# Patient Record
Sex: FEMALE | Race: BLACK OR AFRICAN AMERICAN | Employment: OTHER | URBAN - METROPOLITAN AREA
[De-identification: names, ages, dates, MRNs, and addresses within clinical notes are randomized per-mention and may not be internally consistent; named-entity substitution may affect disease eponyms.]

---

## 2017-02-13 ENCOUNTER — HOSPITAL ENCOUNTER (OUTPATIENT)
Dept: LAB | Age: 82
Discharge: HOME | End: 2017-02-13
Payer: MEDICARE

## 2017-02-13 ENCOUNTER — OFFICE VISIT (OUTPATIENT)
Dept: HEMATOLOGY | Age: 82
End: 2017-02-13

## 2017-02-13 VITALS
TEMPERATURE: 97 F | WEIGHT: 176 LBS | BODY MASS INDEX: 27.62 KG/M2 | OXYGEN SATURATION: 98 % | SYSTOLIC BLOOD PRESSURE: 139 MMHG | RESPIRATION RATE: 16 BRPM | DIASTOLIC BLOOD PRESSURE: 63 MMHG | HEART RATE: 41 BPM | HEIGHT: 67 IN

## 2017-02-13 DIAGNOSIS — B17.10 ACUTE HEPATITIS C VIRUS INFECTION WITHOUT HEPATIC COMA: ICD-10-CM

## 2017-02-13 DIAGNOSIS — B18.2 CHRONIC HEPATITIS C WITHOUT HEPATIC COMA (HCC): Primary | ICD-10-CM

## 2017-02-13 DIAGNOSIS — B18.2 CHRONIC HEPATITIS C WITHOUT HEPATIC COMA (HCC): ICD-10-CM

## 2017-02-13 LAB
ALBUMIN SERPL BCP-MCNC: 3.9 G/DL (ref 3.4–5)
ALBUMIN/GLOB SERPL: 1 {RATIO} (ref 0.8–1.7)
ALP SERPL-CCNC: 188 U/L (ref 45–117)
ALT SERPL-CCNC: 38 U/L (ref 13–56)
ANION GAP BLD CALC-SCNC: 12 MMOL/L (ref 3–18)
AST SERPL W P-5'-P-CCNC: 33 U/L (ref 15–37)
BASOPHILS # BLD AUTO: 0 K/UL (ref 0–0.06)
BASOPHILS # BLD: 1 % (ref 0–2)
BILIRUB DIRECT SERPL-MCNC: 0.1 MG/DL (ref 0–0.2)
BILIRUB SERPL-MCNC: 0.4 MG/DL (ref 0.2–1)
BUN SERPL-MCNC: 33 MG/DL (ref 7–18)
BUN/CREAT SERPL: 17 (ref 12–20)
CALCIUM SERPL-MCNC: 9.8 MG/DL (ref 8.5–10.1)
CHLORIDE SERPL-SCNC: 103 MMOL/L (ref 100–108)
CO2 SERPL-SCNC: 28 MMOL/L (ref 21–32)
CREAT SERPL-MCNC: 1.96 MG/DL (ref 0.6–1.3)
DIFFERENTIAL METHOD BLD: ABNORMAL
EOSINOPHIL # BLD: 0.2 K/UL (ref 0–0.4)
EOSINOPHIL NFR BLD: 4 % (ref 0–5)
ERYTHROCYTE [DISTWIDTH] IN BLOOD BY AUTOMATED COUNT: 13.1 % (ref 11.6–14.5)
GLOBULIN SER CALC-MCNC: 4.1 G/DL (ref 2–4)
GLUCOSE SERPL-MCNC: 106 MG/DL (ref 74–99)
HCT VFR BLD AUTO: 37.4 % (ref 35–45)
HGB BLD-MCNC: 12.4 G/DL (ref 12–16)
LYMPHOCYTES # BLD AUTO: 33 % (ref 21–52)
LYMPHOCYTES # BLD: 1.3 K/UL (ref 0.9–3.6)
MCH RBC QN AUTO: 29.4 PG (ref 24–34)
MCHC RBC AUTO-ENTMCNC: 33.2 G/DL (ref 31–37)
MCV RBC AUTO: 88.6 FL (ref 74–97)
MONOCYTES # BLD: 0.3 K/UL (ref 0.05–1.2)
MONOCYTES NFR BLD AUTO: 8 % (ref 3–10)
NEUTS SEG # BLD: 2.2 K/UL (ref 1.8–8)
NEUTS SEG NFR BLD AUTO: 54 % (ref 40–73)
PLATELET # BLD AUTO: 221 K/UL (ref 135–420)
PMV BLD AUTO: 11.7 FL (ref 9.2–11.8)
POTASSIUM SERPL-SCNC: 4.7 MMOL/L (ref 3.5–5.5)
PROT SERPL-MCNC: 8 G/DL (ref 6.4–8.2)
RBC # BLD AUTO: 4.22 M/UL (ref 4.2–5.3)
SODIUM SERPL-SCNC: 143 MMOL/L (ref 136–145)
WBC # BLD AUTO: 4.1 K/UL (ref 4.6–13.2)

## 2017-02-13 PROCEDURE — 80048 BASIC METABOLIC PNL TOTAL CA: CPT | Performed by: NURSE PRACTITIONER

## 2017-02-13 PROCEDURE — 87900 PHENOTYPE INFECT AGENT DRUG: CPT | Performed by: NURSE PRACTITIONER

## 2017-02-13 PROCEDURE — 36415 COLL VENOUS BLD VENIPUNCTURE: CPT | Performed by: NURSE PRACTITIONER

## 2017-02-13 PROCEDURE — 87522 HEPATITIS C REVRS TRNSCRPJ: CPT | Performed by: NURSE PRACTITIONER

## 2017-02-13 PROCEDURE — 85025 COMPLETE CBC W/AUTO DIFF WBC: CPT | Performed by: NURSE PRACTITIONER

## 2017-02-13 PROCEDURE — 80076 HEPATIC FUNCTION PANEL: CPT | Performed by: NURSE PRACTITIONER

## 2017-02-13 NOTE — PROGRESS NOTES
Gage Lowry MD, CHIP Lenz PA-C Haze Grumbles, MD, Aliya Lange MD     [facility], NP     Jana Robledo NP        [address] [facility], [address] [address]  22.     [phone]     FAX: [address] [place] [address], [address] [place] [address], [address]     [phone]     FAX: [phone]           Patient Care Team:  Kathe Benjamin MD as PCP - General (Family Practice)  Jean Magaña MD (Oncology)  Stephenie Thurman MD (Nephrology)      Problem List  Date Reviewed: 8/11/2016          Codes Class Noted    Secondary hypertension ICD-10-CM: I15.9  ICD-9-CM: 405.99  7/14/2016        S/p nephrectomy ICD-10-CM: Z90.5  ICD-9-CM: V45.73  7/14/2016        Vitamin D deficiency ICD-10-CM: E55.9  ICD-9-CM: 268.9  7/14/2016        Hyperlipidemia ICD-10-CM: E78.5  ICD-9-CM: 272.4  7/14/2016        Hepatitis C, acute ICD-10-CM: B17.10  ICD-9-CM: 070.51  7/14/2016              Mireya Mcgill returns to the [place] [address] today for education and management of chronic hepatitis C virus. The active problem list, all pertinent past medical history, medications, liver histology, endoscopic studies, radiologic findings and laboratory findings related to the liver disorder were reviewed with the patient. The patient is a 80 y.o. Black female who was noted to have chronic HCV in the early 1990's. The HCV has never been treated. 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 Imaging of the liver was recently performed with ultrasound and this demonstrates a diffusely heterogeneous hepatic echotexture. No focal hepatic mass. A liver biopsy has not been performed.    Shear wave elastography was performed and suggest mild disease, F1.  KPa is 6.31    The most recent laboratory studies indicate that the liver transaminases are normal, alkaline phosphatase is elevated, tests of hepatic synthetic and metabolic function are normal, and the platelet count is normal.      The patient has no symptoms which can be attributed to the liver disorder. She had a nephrectomy over 20 years ago for benign mass. The patient completes all daily activities without any functional limitations. The patient has not experienced fatigue, fevers, chills, shortness of breath, chest pain, pain in the right side over the liver, diffuse abdominal pain, nausea, vomiting, constipation, diarrhrea, dry eyes, dry mouth, arthralgias, myalgias, yellowing of the eyes or skin, itching, dark urine, problems concentrating, swelling of the abdomen, swelling of the lower extremities, hematemesis, or hematochezia. ALLERGIES  Allergies   Allergen Reactions    Iron Other (comments)     Intolerant -- IV infused iron    Percocet [Oxycodone-Acetaminophen] Other (comments)     Cardiac Dysrhythmia     Ultram [Tramadol] Other (comments)     Cardiac Dysrhythmia       MEDICATIONS  Current Outpatient Prescriptions   Medication Sig    ergocalciferol (ERGOCALCIFEROL) 50,000 unit capsule Take 50,000 Units by mouth.  cloNIDine HCl (CATAPRES) 0.1 mg tablet Take  by mouth two (2) times a day.  atorvastatin (LIPITOR) 10 mg tablet Take  by mouth daily.  furosemide (LASIX) 20 mg tablet Take  by mouth daily.  docusate sodium (COLACE) 100 mg capsule Take 100 mg by mouth two (2) times a day.  amLODIPine (NORVASC) 10 mg tablet Take  by mouth daily.  gabapentin (NEURONTIN) 300 mg capsule Take 300 mg by mouth three (3) times daily.  acetaminophen-codeine (TYLENOL #3) 300-30 mg per tablet Take 1 Tab by mouth every four (4) hours as needed for Pain.  sodium polystyrene (KAYEXALATE) 15 gram/60 mL suspension Take 15 g by mouth now.  omega-3 fatty acids-vitamin e 1,000 mg cap Take 1 Cap by mouth.     FERROUS SULFATE PO Take  by mouth. No current facility-administered medications for this visit. SYSTEM REVIEW NOT RELATED TO LIVER DISEASE OR REVIEWED ABOVE:  Constitution systems: Negative for fever, chills, weight gain, weight loss. Eyes: Negative for visual changes. ENT: Negative for sore throat, painful swallowing. Respiratory: Negative for cough, hemoptysis, SOB. Cardiology: Negative for chest pain, palpitations. GI:  Negative for constipation or diarrhea. : Negative for urinary frequency, dysuria, hematuria, nocturia. Skin: Negative for rash. Hematology: Negative for easy bruising, blood clots. Musculo-skelatal: Negative for back pain, muscle pain, weakness. Neurologic: Negative for headaches, dizziness, vertigo, memory problems not related to HE. Psychology: Negative for anxiety, depression. FAMILY HISTORY:  There is no family history of liver disease. SOCIAL HISTORY:  The patient is . The patient has 1 child. The patient has never used tobacco products. The patient has never consumed significant amounts of alcohol. The patient does not work. PHYSICAL EXAMINATION:  Visit Vitals    /63 (BP 1 Location: Left arm, BP Patient Position: Sitting)    Pulse (!) 41    Temp 97 °F (36.1 °C) (Tympanic)    Resp 16    Ht 5' 7\" (1.702 m)    Wt 176 lb (79.8 kg)    SpO2 98%    BMI 27.57 kg/m2     General: No acute distress. Eyes: Sclera anicteric. ENT: No oral lesions. Thyroid normal.  Nodes: No adenopathy. Skin: No spider angiomata. No jaundice. No palmar erythema. Respiratory: Lungs clear to auscultation. Cardiovascular: Regular heart rate. No murmurs. No JVD. Abdomen: Soft non-tender. Liver size normal to percussion/palpation. Spleen not palpable. No obvious ascites. Extremities: No edema. No muscle wasting. No gross arthritic changes. Neurologic: Alert and oriented. Cranial nerves grossly intact. No asterixis.     LABORATORY STUDIES:  Liver [facility] Ref Rng 7/14/2016   WBC 4.6 - 13.2 K/uL 3.8 (L)   ANC 1.8 - 8.0 K/UL 2.0   HGB 12.0 - 16.0 g/dL 12.9    - 420 K/uL 220   INR 0.8 - 1.2   1.0   AST 15 - 37 U/L 31   ALT 13 - 56 U/L 39   Alk Phos 45 - 117 U/L 194 (H)   Bili, Total 0.2 - 1.0 MG/DL 0.4   Bili, Direct 0.0 - 0.2 MG/DL 0.1   Albumin 3.4 - 5.0 g/dL 4.0   BUN 7.0 - 18 MG/DL 33 (H)   Creat 0.6 - 1.3 MG/DL 1.99 (H)   Na 136 - 145 mmol/L 141   K 3.5 - 5.5 mmol/L 4.4   Cl 100 - 108 mmol/L 102   CO2 21 - 32 mmol/L 29   Glucose 74 - 99 mg/dL 92     SEROLOGIES:  Serologies Latest Ref Rng 7/14/2016   Hep A Ab, Total NEGATIVE   Positive (A)   Hep B Surface Ag <1.00 Index <0.10   Hep B Surface Ag Interp NEG   NEGATIVE   Hep B Core Ab, Total NEGATIVE   NEGATIVE   Hep B Surface Ab >10.0 mIU/mL <3.10 (L)   Hep B Surface Ab Interp POS   NEGATIVE (A)   Hep C Genotype  1a   HCV RT-PCR, Quant IU/mL [id number]   Ferritin 8 - 388 NG/ML 91   Iron % Saturation % 29   ADDY, IFA  NEGATIVE   ASMCA 0 - 19 Units 14   M2 Ab 0.0 - 20.0 Units 8.0     LIVER HISTOLOGY:  07/2016. Shear wave elastography with ultrasound.  kPa was 6.31. Suggested fibrosis level is F1.    ENDOSCOPIC PROCEDURES:  Not available or performed    RADIOLOGY:  07/2016. Ultrasound of the liver. Diffusely heterogeneous hepatic echotexture. No focal hepatic mass. OTHER TESTING:  Not available or performed    ASSESSMENT AND PLAN:  Chronic HCV with portal fibrosis. The most recent laboratory studies indicate that the liver transaminases are normal, alkaline phosphatase is elevated, tests of hepatic synthetic and metabolic function are normal, and the platelet count is normal.  Will perform laboratory testing to monitor liver function and degree of liver injury. This will include hepatic panel, a CBC w/ diff, a BMP, an NS5A resistance assay, and an HCV RNA. Serologic evaluation was negative for all causes of chronic liver disease.      Based upon laboratory studies, imaging, and elastography, the patient does not appear to have advanced liver disease or cirrhosis. HCV. The patient has genotype 1A and is treatment naive. She is very healthy and likely has higher than expected life expectancy. Treatment is simple, finite, and safe. We discussed treatment options in detail. A treatment regime for renally impaired patients is now available. This treatment is Zepatier, a combination medication utilizing elbasvir (an NS5A inhibitor) and grazoprevir (an NS3/4A protease inhibitor), either with or without ribavirin for either 12 or 16 weeks depending on the patients NS5A polymorphism status. Her creatinine  clearance is 26.2. Veldon Canter was previously ordered but denied because she never had the NS5A testing completed. Ultrasound with shear wave elastography was recently completed. This demonstrates an essentially normal liver with mild fibrosis, F1. The patient was directed to continue all current medications at the current dosages. There are no contraindications for the patient to take any medications that are necessary for treatment of other medical issues. Vaccination for viral hepatitis A not required. The patient has serologic evidence of prior exposure or vaccination with immunity. Vaccination for viral hepatitis B is recommended. The patient has no serologic evidence of prior exposure or vaccination with immunity. All of the above issues were discussed with the patient. All questions were answered. The patient expressed a clear understanding of the above. [facility] 87 in 4 months. She will make a treatment week 4 appointment if we able to get the Zepatier approved.       Collin Wallis NP   [facility] [place] of [address] [place] [address], [address] Kadie Link, [address] [facility]   [phone]

## 2017-02-13 NOTE — MR AVS SNAPSHOT
Visit Information Date & Time Provider Department Dept. Phone Encounter #  
 2/13/2017 10:30 AM May Mccauley NP Liver [place] of [address] [place] [address] [id number] Follow-up Instructions Return in about 3 months (around 5/13/2017). Upcoming Health Maintenance Date Due DTaP/Tdap/Td series (1 - Tdap) 9/28/1955 ZOSTER VACCINE AGE 60> 9/28/1994 GLAUCOMA SCREENING Q2Y 9/28/1999 OSTEOPOROSIS SCREENING (DEXA) 9/28/1999 Pneumococcal 65+ Low/Medium Risk (1 of 2 - PCV13) 9/28/1999 MEDICARE YEARLY EXAM 9/28/1999 INFLUENZA AGE 9 TO ADULT 8/1/2016 Allergies as of 2/13/2017  Review Complete On: 2/13/2017 By: May Mccauley NP Severity Noted Reaction Type Reactions Iron  07/14/2016    Other (comments) Intolerant -- IV infused iron Percocet [Oxycodone-acetaminophen]  07/14/2016    Other (comments) Cardiac Dysrhythmia Ultram [Tramadol]  07/14/2016    Other (comments) Cardiac Dysrhythmia Current Immunizations  Never Reviewed No immunizations on file. Not reviewed this visit You Were Diagnosed With   
  
 Codes Comments Chronic hepatitis C without hepatic coma (HCC)    -  Primary ICD-10-CM: B18.2 ICD-9-CM: 070.54 Acute hepatitis C virus infection without hepatic coma     ICD-10-CM: B17.10 ICD-9-CM: 070.51 Vitals BP Pulse Temp Resp Height(growth percentile) Weight(growth percentile) 139/63 (BP 1 Location: Left arm, BP Patient Position: Sitting) (!) 41 97 °F (36.1 °C) (Tympanic) 16 5' 7\" (1.702 m) 176 lb (79.8 kg) SpO2 BMI OB Status Smoking Status 98% 27.57 kg/m2 Hysterectomy Former Smoker Vitals History BMI and BSA Data Body Mass Index Body Surface Area  
 27.57 kg/m 2 1.94 m 2 Preferred Pharmacy Pharmacy Name Phone [phone] - [place] [facility], [facility] AT [address] [place] [address] 80 [phone] Your Updated Medication List  
  
   
This list is accurate as of: 2/13/17 11:31 AM.  Always use your most recent med list.  
  
  
  
  
 acetaminophen-codeine 300-30 mg per tablet Commonly known as:  TYLENOL #3 Take 1 Tab by mouth every four (4) hours as needed for Pain. amLODIPine 10 mg tablet Commonly known as:  Arlyss [place] Take  by mouth daily. atorvastatin 10 mg tablet Commonly known as:  LIPITOR Take  by mouth daily. cloNIDine HCl 0.1 mg tablet Commonly known as:  CATAPRES Take  by mouth two (2) times a day. docusate sodium 100 mg capsule Commonly known as:  Vermell Nones Take 100 mg by mouth two (2) times a day. ergocalciferol 50,000 unit capsule Commonly known as:  ERGOCALCIFEROL Take 50,000 Units by mouth. FERROUS SULFATE PO Take  by mouth. furosemide 20 mg tablet Commonly known as:  LASIX Take  by mouth daily. gabapentin 300 mg capsule Commonly known as:  NEURONTIN Take 300 mg by mouth three (3) times daily. omega-3 fatty acids-vitamin e 1,000 mg Cap Take 1 Cap by mouth.  
  
 sodium polystyrene 15 gram/60 mL suspension Commonly known as:  KAYEXALATE Take 15 g by mouth now. Follow-up Instructions Return in about 3 months (around 5/13/2017). To-Do List   
 02/13/2017 Lab:  CBC WITH AUTOMATED DIFF   
  
 02/13/2017 Lab:  HCV NS5A DRUG RESISTANCE ASSAY   
  
 02/13/2017 Lab:  HCV, QT WITH GRAPH   
  
 02/13/2017 Lab:  HEPATIC FUNCTION PANEL   
  
 02/13/2017 Lab:  METABOLIC PANEL, BASIC Introducing [facility] & HEALTH SERVICES! [employer] introduces [employer] patient portal. Now you can access parts of your medical record, email your doctor's office, and request medication refills online. 1. In your internet browser, go to [email or web address] 2. Click on the First Time User? Click Here link in the Sign In box. You will see the New Member Sign Up page. 3. Enter your [employer] Access Code exactly as it appears below. You will not need to use this code after youve completed the sign-up process. If you do not sign up before the expiration date, you must request a new code. · [employer] Access Code: [email or web address] Expires: 5/14/2017 11:31 AM 
 
4. Enter the last four digits of your Social Security Number (xxxx) and Date of Birth (mm/dd/yyyy) as indicated and click Submit. You will be taken to the next sign-up page. 5. Create a [employer] ID. This will be your [employer] login ID and cannot be changed, so think of one that is secure and easy to remember. 6. Create a [employer] password. You can change your password at any time. 7. Enter your Password Reset Question and Answer. This can be used at a later time if you forget your password. 8. Enter your e-mail address. You will receive e-mail notification when new information is available in [address] 9. Click Sign Up. You can now view and download portions of your medical record. 10. Click the Download Summary menu link to download a portable copy of your medical information. If you have questions, please visit the Frequently Asked Questions section of the [employer] website. Remember, [employer] is NOT to be used for urgent needs. For medical emergencies, dial 911. Now available from your iPhone and Android! Please provide this summary of care documentation to your next provider. Your primary care clinician is listed as [employer]. If you have any questions after today's visit, please call [phone].

## 2017-03-08 LAB
HCV NS5 MUT DET ISLT GENOTYP: NORMAL
HCV RESIS PANELL ISLT GENOTYP: NORMAL
REF LAB TEST METHOD: NORMAL

## 2017-03-19 LAB
HCV GRAPH, HCVGR: NORMAL
HCV RNA SERPL NAA+PROBE-ACNC: NORMAL IU/ML
HCV RNA SERPL NAA+PROBE-LOG IU: 5.72 LOG10 IU/ML
SERIAL MONITORING: NORMAL

## 2017-05-16 ENCOUNTER — HOSPITAL ENCOUNTER (OUTPATIENT)
Dept: LAB | Age: 82
Discharge: HOME | End: 2017-05-16
Payer: MEDICARE

## 2017-05-16 ENCOUNTER — OFFICE VISIT (OUTPATIENT)
Dept: HEMATOLOGY | Age: 82
End: 2017-05-16

## 2017-05-16 VITALS
DIASTOLIC BLOOD PRESSURE: 68 MMHG | OXYGEN SATURATION: 100 % | HEIGHT: 67 IN | SYSTOLIC BLOOD PRESSURE: 159 MMHG | BODY MASS INDEX: 25.74 KG/M2 | HEART RATE: 45 BPM | TEMPERATURE: 97.4 F | WEIGHT: 164 LBS | RESPIRATION RATE: 16 BRPM

## 2017-05-16 DIAGNOSIS — B18.2 CHRONIC HEPATITIS C WITHOUT HEPATIC COMA (HCC): Primary | ICD-10-CM

## 2017-05-16 DIAGNOSIS — B18.2 CHRONIC HEPATITIS C WITHOUT HEPATIC COMA (HCC): ICD-10-CM

## 2017-05-16 LAB
ALBUMIN SERPL BCP-MCNC: 4 G/DL (ref 3.4–5)
ALBUMIN/GLOB SERPL: 0.8 {RATIO} (ref 0.8–1.7)
ALP SERPL-CCNC: 217 U/L (ref 45–117)
ALT SERPL-CCNC: 27 U/L (ref 13–56)
ANION GAP BLD CALC-SCNC: 8 MMOL/L (ref 3–18)
AST SERPL W P-5'-P-CCNC: 30 U/L (ref 15–37)
BASOPHILS # BLD AUTO: 0 K/UL (ref 0–0.06)
BASOPHILS # BLD: 1 % (ref 0–2)
BILIRUB DIRECT SERPL-MCNC: 0.1 MG/DL (ref 0–0.2)
BILIRUB SERPL-MCNC: 0.4 MG/DL (ref 0.2–1)
BUN SERPL-MCNC: 26 MG/DL (ref 7–18)
BUN/CREAT SERPL: 14 (ref 12–20)
CALCIUM SERPL-MCNC: 10.2 MG/DL (ref 8.5–10.1)
CHLORIDE SERPL-SCNC: 105 MMOL/L (ref 100–108)
CO2 SERPL-SCNC: 28 MMOL/L (ref 21–32)
CREAT SERPL-MCNC: 1.9 MG/DL (ref 0.6–1.3)
DIFFERENTIAL METHOD BLD: ABNORMAL
EOSINOPHIL # BLD: 0.1 K/UL (ref 0–0.4)
EOSINOPHIL NFR BLD: 3 % (ref 0–5)
ERYTHROCYTE [DISTWIDTH] IN BLOOD BY AUTOMATED COUNT: 12.8 % (ref 11.6–14.5)
GLOBULIN SER CALC-MCNC: 5 G/DL (ref 2–4)
GLUCOSE SERPL-MCNC: 89 MG/DL (ref 74–99)
HCT VFR BLD AUTO: 38.3 % (ref 35–45)
HGB BLD-MCNC: 13.1 G/DL (ref 12–16)
LYMPHOCYTES # BLD AUTO: 33 % (ref 21–52)
LYMPHOCYTES # BLD: 1.2 K/UL (ref 0.9–3.6)
MCH RBC QN AUTO: 29.2 PG (ref 24–34)
MCHC RBC AUTO-ENTMCNC: 34.2 G/DL (ref 31–37)
MCV RBC AUTO: 85.5 FL (ref 74–97)
MONOCYTES # BLD: 0.2 K/UL (ref 0.05–1.2)
MONOCYTES NFR BLD AUTO: 6 % (ref 3–10)
NEUTS SEG # BLD: 2.1 K/UL (ref 1.8–8)
NEUTS SEG NFR BLD AUTO: 57 % (ref 40–73)
PLATELET # BLD AUTO: 195 K/UL (ref 135–420)
PMV BLD AUTO: 11.4 FL (ref 9.2–11.8)
POTASSIUM SERPL-SCNC: 3.7 MMOL/L (ref 3.5–5.5)
PROT SERPL-MCNC: 9 G/DL (ref 6.4–8.2)
RBC # BLD AUTO: 4.48 M/UL (ref 4.2–5.3)
SODIUM SERPL-SCNC: 141 MMOL/L (ref 136–145)
WBC # BLD AUTO: 3.7 K/UL (ref 4.6–13.2)

## 2017-05-16 PROCEDURE — 80048 BASIC METABOLIC PNL TOTAL CA: CPT | Performed by: NURSE PRACTITIONER

## 2017-05-16 PROCEDURE — 85025 COMPLETE CBC W/AUTO DIFF WBC: CPT | Performed by: NURSE PRACTITIONER

## 2017-05-16 PROCEDURE — 36415 COLL VENOUS BLD VENIPUNCTURE: CPT | Performed by: NURSE PRACTITIONER

## 2017-05-16 PROCEDURE — 80076 HEPATIC FUNCTION PANEL: CPT | Performed by: NURSE PRACTITIONER

## 2017-05-16 PROCEDURE — 87522 HEPATITIS C REVRS TRNSCRPJ: CPT | Performed by: NURSE PRACTITIONER

## 2017-05-16 PROCEDURE — 87902 NFCT AGT GNTYP ALYS HEP C: CPT | Performed by: NURSE PRACTITIONER

## 2017-05-16 PROCEDURE — 87900 PHENOTYPE INFECT AGENT DRUG: CPT | Performed by: NURSE PRACTITIONER

## 2017-05-16 NOTE — MR AVS SNAPSHOT
Visit Information Date & Time Provider Department Dept. Phone Encounter #  
 5/16/2017 11:00 AM Alessandra Theodore NP Liver [place] of [address] [place] [address] [id number] Follow-up Instructions Return in about 3 months (around 8/16/2017). Upcoming Health Maintenance Date Due DTaP/Tdap/Td series (1 - Tdap) 9/28/1955 ZOSTER VACCINE AGE 60> 9/28/1994 GLAUCOMA SCREENING Q2Y 9/28/1999 OSTEOPOROSIS SCREENING (DEXA) 9/28/1999 Pneumococcal 65+ Low/Medium Risk (1 of 2 - PCV13) 9/28/1999 MEDICARE YEARLY EXAM 9/28/1999 INFLUENZA AGE 9 TO ADULT 8/1/2017 Allergies as of 5/16/2017  Review Complete On: 5/16/2017 By: Sierra Hurtado Severity Noted Reaction Type Reactions Iron  07/14/2016    Other (comments) Intolerant -- IV infused iron Percocet [Oxycodone-acetaminophen]  07/14/2016    Other (comments) Cardiac Dysrhythmia Ultram [Tramadol]  07/14/2016    Other (comments) Cardiac Dysrhythmia Current Immunizations  Never Reviewed No immunizations on file. Not reviewed this visit You Were Diagnosed With   
  
 Codes Comments Chronic hepatitis C without hepatic coma (HCC)    -  Primary ICD-10-CM: B18.2 ICD-9-CM: 070.54 Vitals BP Pulse Temp Resp Height(growth percentile) 159/68 (BP 1 Location: Left arm, BP Patient Position: Sitting) (!) 45 97.4 °F (36.3 °C) (Tympanic) 16 5' 7\" (1.702 m) Weight(growth percentile) SpO2 BMI OB Status Smoking Status 164 lb (74.4 kg) 100% 25.69 kg/m2 Hysterectomy Former Smoker Vitals History BMI and BSA Data Body Mass Index Body Surface Area  
 25.69 kg/m 2 1.88 m 2 Preferred Pharmacy Pharmacy Name Phone [phone] - [employer] [facility], [address] [place] [address] AT [address] [place] [address] [phone] Your Updated Medication List  
  
   
This list is accurate as of: 5/16/17 11:31 AM.  Always use your most recent med list.  
  
  
  
  
 acetaminophen-codeine 300-30 mg per tablet Commonly known as:  TYLENOL #3 Take 1 Tab by mouth every four (4) hours as needed for Pain. amLODIPine 10 mg tablet Commonly known as:  Emily Cox Take  by mouth daily. atorvastatin 10 mg tablet Commonly known as:  LIPITOR Take  by mouth daily. cloNIDine HCl 0.1 mg tablet Commonly known as:  CATAPRES Take  by mouth two (2) times a day. ergocalciferol 50,000 unit capsule Commonly known as:  ERGOCALCIFEROL Take 50,000 Units by mouth. FERROUS SULFATE PO Take  by mouth. furosemide 20 mg tablet Commonly known as:  LASIX Take  by mouth daily. gabapentin 300 mg capsule Commonly known as:  NEURONTIN Take 300 mg by mouth three (3) times daily. omega-3 fatty acids-vitamin e 1,000 mg Cap Take 1 Cap by mouth. Follow-up Instructions Return in about 3 months (around 8/16/2017). To-Do List   
 05/16/2017 Lab:  CBC WITH AUTOMATED DIFF   
  
 05/16/2017 Lab:  HCV NS5A DRUG RESISTANCE ASSAY   
  
 05/16/2017 Lab:  HCV, QT WITH GRAPH   
  
 05/16/2017 Lab:  HEPATIC FUNCTION PANEL   
  
 05/16/2017 Lab:  METABOLIC PANEL, BASIC Introducing [facility] & HEALTH SERVICES! [employer] introduces [employer] patient portal. Now you can access parts of your medical record, email your doctor's office, and request medication refills online. 1. In your internet browser, go to [email or web address] 2. Click on the First Time User? Click Here link in the Sign In box. You will see the New Member Sign Up page. 3. Enter your [employer] Access Code exactly as it appears below. You will not need to use this code after youve completed the sign-up process. If you do not sign up before the expiration date, you must request a new code. · [employer] Access Code: [email or web address] Expires: 8/14/2017 11:24 AM 
 
 4. Enter the last four digits of your Social Security Number (xxxx) and Date of Birth (mm/dd/yyyy) as indicated and click Submit. You will be taken to the next sign-up page. 5. Create a [employer] ID. This will be your [employer] login ID and cannot be changed, so think of one that is secure and easy to remember. 6. Create a [employer] password. You can change your password at any time. 7. Enter your Password Reset Question and Answer. This can be used at a later time if you forget your password. 8. Enter your e-mail address. You will receive e-mail notification when new information is available in [address] 9. Click Sign Up. You can now view and download portions of your medical record. 10. Click the Download Summary menu link to download a portable copy of your medical information. If you have questions, please visit the Frequently Asked Questions section of the [employer] website. Remember, [employer] is NOT to be used for urgent needs. For medical emergencies, dial 911. Now available from your iPhone and Android! Please provide this summary of care documentation to your next provider. Your primary care clinician is listed as [employer]. If you have any questions after today's visit, please call [phone].

## 2017-05-16 NOTE — PROGRESS NOTES
93 Mayito Meier MD, Shira Briceno, [facility]     April Evans Lava, NP Clarance Lanes, PA-C Ardelle Coles, MD, Shira Briceno, Jess Pattee, MD Amy Caridad Byes, NP Mortimer Coombe, CHIP        [address] [facility], [address] [address]  22.     [phone]     FAX: [address] [place] [address], [address] [place] [address], [address]     [phone]     FAX: [phone]           Patient Care Team:  Kanika Hampton MD as PCP - General (Family Practice)  Shwetha Garcia MD (Oncology)  Sue Munoz MD (Nephrology)      Problem List  Date Reviewed: 5/16/2017          Codes Class Noted    Secondary hypertension ICD-10-CM: I15.9  ICD-9-CM: 405.99  7/14/2016        S/p nephrectomy ICD-10-CM: Z90.5  ICD-9-CM: V45.73  7/14/2016    Overview Signed 5/16/2017 11:23 AM by Mortimer Coombe, NP     Right kidney in 1985. Vitamin D deficiency ICD-10-CM: E55.9  ICD-9-CM: 268.9  7/14/2016        Hyperlipidemia ICD-10-CM: E78.5  ICD-9-CM: 272.4  7/14/2016        Hepatitis C, acute ICD-10-CM: B17.10  ICD-9-CM: 070.51  7/14/2016              Mere Sun returns to the [place] [address] today for education and management of chronic hepatitis C virus. The active problem list, all pertinent past medical history, medications, liver histology, endoscopic studies, radiologic findings and laboratory findings related to the liver disorder were reviewed with the patient. The patient is a 80 y.o. Black female who was noted to have chronic HCV in the early 1990's. The HCV has never been treated. 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 Imaging of the liver was recently performed with ultrasound and this demonstrates a diffusely heterogeneous hepatic echotexture. No focal hepatic mass. A liver biopsy has not been performed.    Shear wave elastography was performed and suggest mild disease, F1.  KPa is 6.31    The most recent laboratory studies indicate that the liver transaminases are normal, alkaline phosphatase is elevated, tests of hepatic synthetic and metabolic function are normal, and the platelet count is normal.      The patient has no symptoms which can be attributed to the liver disorder. She had a nephrectomy over 32 years ago for benign mass. The patient completes all daily activities without any functional limitations. The patient has not experienced fatigue, fevers, chills, shortness of breath, chest pain, pain in the right side over the liver, diffuse abdominal pain, nausea, vomiting, constipation, diarrhrea, dry eyes, dry mouth, arthralgias, myalgias, yellowing of the eyes or skin, itching, dark urine, problems concentrating, swelling of the abdomen, swelling of the lower extremities, hematemesis, or hematochezia. ALLERGIES  Allergies   Allergen Reactions    Iron Other (comments)     Intolerant -- IV infused iron    Percocet [Oxycodone-Acetaminophen] Other (comments)     Cardiac Dysrhythmia     Ultram [Tramadol] Other (comments)     Cardiac Dysrhythmia       MEDICATIONS  Current Outpatient Prescriptions   Medication Sig    ergocalciferol (ERGOCALCIFEROL) 50,000 unit capsule Take 50,000 Units by mouth.  cloNIDine HCl (CATAPRES) 0.1 mg tablet Take  by mouth two (2) times a day.  atorvastatin (LIPITOR) 10 mg tablet Take  by mouth daily.  furosemide (LASIX) 20 mg tablet Take  by mouth daily.  amLODIPine (NORVASC) 10 mg tablet Take  by mouth daily.  gabapentin (NEURONTIN) 300 mg capsule Take 300 mg by mouth three (3) times daily.  acetaminophen-codeine (TYLENOL #3) 300-30 mg per tablet Take 1 Tab by mouth every four (4) hours as needed for Pain.  omega-3 fatty acids-vitamin e 1,000 mg cap Take 1 Cap by mouth.  FERROUS SULFATE PO Take  by mouth.      No current facility-administered medications for this visit. SYSTEM REVIEW NOT RELATED TO LIVER DISEASE OR REVIEWED ABOVE:  Constitution systems: Negative for fever, chills, weight gain, weight loss. Eyes: Negative for visual changes. ENT: Negative for sore throat, painful swallowing. Respiratory: Negative for cough, hemoptysis, SOB. Cardiology: Negative for chest pain, palpitations. GI:  Negative for constipation or diarrhea. : Negative for urinary frequency, dysuria, hematuria, nocturia. Skin: Negative for rash. Hematology: Negative for easy bruising, blood clots. Musculo-skelatal: Negative for back pain, muscle pain, weakness. Neurologic: Negative for headaches, dizziness, vertigo, memory problems not related to HE. Psychology: Negative for anxiety, depression. FAMILY HISTORY:  There is no family history of liver disease. SOCIAL HISTORY:  The patient is . The patient has 1 child. The patient has never used tobacco products. The patient has never consumed significant amounts of alcohol. The patient does not work. PHYSICAL EXAMINATION:  Visit Vitals    /68 (BP 1 Location: Left arm, BP Patient Position: Sitting)    Pulse (!) 45    Temp 97.4 °F (36.3 °C) (Tympanic)    Resp 16    Ht 5' 7\" (1.702 m)    Wt 164 lb (74.4 kg)    SpO2 100%    BMI 25.69 kg/m2     General: No acute distress. Eyes: Sclera anicteric. ENT: No oral lesions. Thyroid normal.  Nodes: No adenopathy. Skin: No spider angiomata. No jaundice. No palmar erythema. Respiratory: Lungs clear to auscultation. Cardiovascular: Regular heart rate. No murmurs. No JVD. Abdomen: Soft non-tender. Liver size normal to percussion/palpation. Spleen not palpable. No obvious ascites. Extremities: No edema. No muscle wasting. No gross arthritic changes. Neurologic: Alert and oriented. Cranial nerves grossly intact. No asterixis.     LABORATORY STUDIES:  Liver [place] of [address] [place] [address] & Units 2/13/2017 7/14/2016 WBC 4.6 - 13.2 K/uL 4.1 (L) 3.8 (L)   ANC 1.8 - 8.0 K/UL 2.2 2.0   HGB 12.0 - 16.0 g/dL 12.4 12.9    - 420 K/uL 221 220   INR 0.8 - 1.2    1.0   AST 15 - 37 U/L 33 31   ALT 13 - 56 U/L 38 39   Alk Phos 45 - 117 U/L 188 (H) 194 (H)   Bili, Total 0.2 - 1.0 MG/DL 0.4 0.4   Bili, Direct 0.0 - 0.2 MG/DL 0.1 0.1   Albumin 3.4 - 5.0 g/dL 3.9 4.0   BUN 7.0 - 18 MG/DL 33 (H) 33 (H)   Creat 0.6 - 1.3 MG/DL 1.96 (H) 1.99 (H)   Na 136 - 145 mmol/L 143 141   K 3.5 - 5.5 mmol/L 4.7 4.4   Cl 100 - 108 mmol/L 103 102   CO2 21 - 32 mmol/L 28 29   Glucose 74 - 99 mg/dL 106 (H) 92     SEROLOGIES:  Serologies Latest Ref [facility] 7/14/2016   Hep A Ab, Total NEGATIVE   Positive (A)   Hep B Surface Ag <1.00 Index <0.10   Hep B Surface Ag Interp NEG   NEGATIVE   Hep B Core Ab, Total NEGATIVE   NEGATIVE   Hep B Surface Ab >10.0 mIU/mL <3.10 (L)   Hep B Surface Ab Interp POS   NEGATIVE (A)   Hep C Genotype  1a   HCV RT-PCR, Quant IU/mL [id number]   Ferritin 8 - 388 NG/ML 91   Iron % Saturation % 29   ADDY, IFA  NEGATIVE   ASMCA 0 - 19 Units 14   M2 Ab 0.0 - 20.0 Units 8.0     LIVER HISTOLOGY:  07/2016. Shear wave elastography with ultrasound.  kPa was 6.31. Suggested fibrosis level is F1.    ENDOSCOPIC PROCEDURES:  Not available or performed    RADIOLOGY:  07/2016. Ultrasound of the liver. Diffusely heterogeneous hepatic echotexture. No focal hepatic mass. OTHER TESTING:  Not available or performed    ASSESSMENT AND PLAN:  Chronic HCV with portal fibrosis. The most recent laboratory studies indicate that the liver transaminases are normal, alkaline phosphatase is elevated, tests of hepatic synthetic and metabolic function are normal, and the platelet count is normal.  Will perform laboratory testing to monitor liver function and degree of liver injury. This will include hepatic panel, a CBC w/ diff, a BMP, an NS5A resistance assay, and an HCV RNA. Serologic evaluation was negative for all causes of chronic liver disease.      Based upon laboratory studies, imaging, and elastography, the patient does not appear to have advanced liver disease or cirrhosis. HCV. The patient has genotype 1A and is treatment naive. She is very healthy and likely has higher than expected life expectancy. Treatment is simple, finite, and safe. We discussed treatment options in detail. A treatment regime for renally impaired patients is now available. This treatment is Zepatier, a combination medication utilizing elbasvir (an NS5A inhibitor) and grazoprevir (an NS3/4A protease inhibitor), either with or without ribavirin for either 12 or 16 weeks depending on the patients NS5A polymorphism status. Her creatinine  clearance is 26.2. Suellen Pearson was previously ordered but denied because she never had the NS5A testing completed. The testing was ordered at her last appointment but the test was not run. Ultrasound with shear wave elastography was recently completed. This demonstrates an essentially normal liver with mild fibrosis, F1. The patient was directed to continue all current medications at the current dosages. There are no contraindications for the patient to take any medications that are necessary for treatment of other medical issues. Vaccination for viral hepatitis A not required. The patient has serologic evidence of prior exposure or vaccination with immunity. Vaccination for viral hepatitis B is recommended. The patient has no serologic evidence of prior exposure or vaccination with immunity. All of the above issues were discussed with the patient. All questions were answered. The patient expressed a clear understanding of the above. [address] [facility] in 3 months. She will make a treatment week 4 appointment if we able to get the Zepatier approved.       Jamee Ford NP   Liver [place] of [address] [place] [address], [address] [place] [address]   [phone]

## 2017-05-18 LAB
HCV GENTYP SERPL NAA+PROBE: NORMAL
PLEASE NOTE, 550474: NORMAL

## 2017-06-01 LAB
HCV GRAPH, HCVGR: NORMAL
HCV RNA SERPL NAA+PROBE-ACNC: NORMAL IU/ML
HCV RNA SERPL NAA+PROBE-LOG IU: 5.7 LOG10 IU/ML
SERIAL MONITORING: NORMAL

## 2017-08-07 ENCOUNTER — TELEPHONE (OUTPATIENT)
Dept: HEMATOLOGY | Age: 82
End: 2017-08-07

## 2017-08-07 NOTE — TELEPHONE ENCOUNTER
Pt. Called saying she is returning Sarah Prabhakar NP. Call , if no answer please leave a voicemail . Pajaros Ring Please Advise . ..           Marilyn Gray MA.

## 2017-08-07 NOTE — TELEPHONE ENCOUNTER
Returned the patients phone call and informed her that she should be receiving the medication that was prescribed to her by NP Burton Sales. She was informed to give us a call if she did not receive the medication within 2 weeks. Pt verbalized understanding.

## 2017-08-21 DIAGNOSIS — B18.2 CHRONIC HEPATITIS C WITHOUT HEPATIC COMA (HCC): Primary | ICD-10-CM

## 2017-08-21 NOTE — TELEPHONE ENCOUNTER
Pt called stated she started HCV tx on 8/10/17 informed pt to come back in the office on 9/717 for lab work, pt verbalized understanding.

## 2017-09-07 ENCOUNTER — HOSPITAL ENCOUNTER (OUTPATIENT)
Dept: LAB | Age: 82
Discharge: HOME | End: 2017-09-07
Payer: MEDICARE

## 2017-09-07 DIAGNOSIS — B18.2 CHRONIC HEPATITIS C WITHOUT HEPATIC COMA (HCC): ICD-10-CM

## 2017-09-07 LAB
ALBUMIN SERPL-MCNC: 3.8 G/DL (ref 3.4–5)
ALBUMIN/GLOB SERPL: 0.8 {RATIO} (ref 0.8–1.7)
ALP SERPL-CCNC: 224 U/L (ref 45–117)
ALT SERPL-CCNC: 17 U/L (ref 13–56)
ANION GAP SERPL CALC-SCNC: 8 MMOL/L (ref 3–18)
AST SERPL-CCNC: 21 U/L (ref 15–37)
BASOPHILS # BLD: 0 K/UL (ref 0–0.06)
BASOPHILS NFR BLD: 1 % (ref 0–2)
BILIRUB DIRECT SERPL-MCNC: 0.1 MG/DL (ref 0–0.2)
BILIRUB SERPL-MCNC: 0.4 MG/DL (ref 0.2–1)
BUN SERPL-MCNC: 37 MG/DL (ref 7–18)
BUN/CREAT SERPL: 19 (ref 12–20)
CALCIUM SERPL-MCNC: 10.5 MG/DL (ref 8.5–10.1)
CHLORIDE SERPL-SCNC: 103 MMOL/L (ref 100–108)
CO2 SERPL-SCNC: 26 MMOL/L (ref 21–32)
CREAT SERPL-MCNC: 1.95 MG/DL (ref 0.6–1.3)
DIFFERENTIAL METHOD BLD: NORMAL
EOSINOPHIL # BLD: 0.2 K/UL (ref 0–0.4)
EOSINOPHIL NFR BLD: 4 % (ref 0–5)
ERYTHROCYTE [DISTWIDTH] IN BLOOD BY AUTOMATED COUNT: 13.3 % (ref 11.6–14.5)
GLOBULIN SER CALC-MCNC: 4.5 G/DL (ref 2–4)
GLUCOSE SERPL-MCNC: 111 MG/DL (ref 74–99)
HCT VFR BLD AUTO: 39 % (ref 35–45)
HGB BLD-MCNC: 13.6 G/DL (ref 12–16)
LYMPHOCYTES # BLD: 1.5 K/UL (ref 0.9–3.6)
LYMPHOCYTES NFR BLD: 30 % (ref 21–52)
MCH RBC QN AUTO: 29.9 PG (ref 24–34)
MCHC RBC AUTO-ENTMCNC: 34.9 G/DL (ref 31–37)
MCV RBC AUTO: 85.7 FL (ref 74–97)
MONOCYTES # BLD: 0.3 K/UL (ref 0.05–1.2)
MONOCYTES NFR BLD: 7 % (ref 3–10)
NEUTS SEG # BLD: 2.7 K/UL (ref 1.8–8)
NEUTS SEG NFR BLD: 58 % (ref 40–73)
PLATELET # BLD AUTO: 198 K/UL (ref 135–420)
PMV BLD AUTO: 10.9 FL (ref 9.2–11.8)
POTASSIUM SERPL-SCNC: 4.2 MMOL/L (ref 3.5–5.5)
PROT SERPL-MCNC: 8.3 G/DL (ref 6.4–8.2)
RBC # BLD AUTO: 4.55 M/UL (ref 4.2–5.3)
SODIUM SERPL-SCNC: 137 MMOL/L (ref 136–145)
WBC # BLD AUTO: 4.8 K/UL (ref 4.6–13.2)

## 2017-09-07 PROCEDURE — 80076 HEPATIC FUNCTION PANEL: CPT | Performed by: NURSE PRACTITIONER

## 2017-09-07 PROCEDURE — 36415 COLL VENOUS BLD VENIPUNCTURE: CPT | Performed by: NURSE PRACTITIONER

## 2017-09-07 PROCEDURE — 87521 HEPATITIS C PROBE&RVRS TRNSC: CPT | Performed by: NURSE PRACTITIONER

## 2017-09-07 PROCEDURE — 80048 BASIC METABOLIC PNL TOTAL CA: CPT | Performed by: NURSE PRACTITIONER

## 2017-09-07 PROCEDURE — 85025 COMPLETE CBC W/AUTO DIFF WBC: CPT | Performed by: NURSE PRACTITIONER

## 2017-09-09 LAB — HCV RNA SERPL QL NAA+PROBE: NEGATIVE

## 2017-09-19 ENCOUNTER — OFFICE VISIT (OUTPATIENT)
Dept: HEMATOLOGY | Age: 82
End: 2017-09-19

## 2017-09-19 VITALS
SYSTOLIC BLOOD PRESSURE: 150 MMHG | HEIGHT: 67 IN | BODY MASS INDEX: 27.25 KG/M2 | WEIGHT: 173.6 LBS | DIASTOLIC BLOOD PRESSURE: 68 MMHG | OXYGEN SATURATION: 98 % | HEART RATE: 55 BPM | RESPIRATION RATE: 16 BRPM | TEMPERATURE: 96.5 F

## 2017-09-19 DIAGNOSIS — B18.2 CHRONIC HEPATITIS C WITHOUT HEPATIC COMA (HCC): Primary | ICD-10-CM

## 2017-09-19 NOTE — MR AVS SNAPSHOT
Visit Information Date & Time Provider Department Dept. Phone Encounter #  
 9/19/2017  3:00 PM [place] [address] [place] [id number] Follow-up Instructions Return in about 17 weeks (around 1/16/2018). Upcoming Health Maintenance Date Due DTaP/Tdap/Td series (1 - Tdap) 9/28/1955 ZOSTER VACCINE AGE 60> 7/28/1994 GLAUCOMA SCREENING Q2Y 9/28/1999 OSTEOPOROSIS SCREENING (DEXA) 9/28/1999 Pneumococcal 65+ Low/Medium Risk (1 of 2 - PCV13) 9/28/1999 MEDICARE YEARLY EXAM 9/28/1999 INFLUENZA AGE 9 TO ADULT 8/1/2017 Allergies as of 9/19/2017  Review Complete On: 9/19/2017 By: Edward Thomas Severity Noted Reaction Type Reactions Iron  07/14/2016    Other (comments) Intolerant -- IV infused iron Percocet [Oxycodone-acetaminophen]  07/14/2016    Other (comments) Cardiac Dysrhythmia Ultram [Tramadol]  07/14/2016    Other (comments) Cardiac Dysrhythmia Current Immunizations  Never Reviewed No immunizations on file. Not reviewed this visit Vitals BP Pulse Temp Resp Height(growth percentile) 150/68 (BP 1 Location: Left arm, BP Patient Position: Sitting) (!) 55 96.5 °F (35.8 °C) (Tympanic) 16 5' 7\" (1.702 m) Weight(growth percentile) SpO2 BMI OB Status Smoking Status 173 lb 9.6 oz (78.7 kg) 98% 27.19 kg/m2 Hysterectomy Former Smoker BMI and BSA Data Body Mass Index Body Surface Area  
 27.19 kg/m 2 1.93 m 2 Preferred Pharmacy Pharmacy Name Phone [facility] Romo @ [address] [place] [address] Lucia Cedillo [phone] Your Updated Medication List  
  
   
This list is accurate as of: 9/19/17  3:25 PM.  Always use your most recent med list. amLODIPine 10 mg tablet Commonly known as:  Sundra [place] Take  by mouth daily. atorvastatin 10 mg tablet Commonly known as:  LIPITOR Take  by mouth daily. cloNIDine HCl 0.1 mg tablet Commonly known as:  CATAPRES Take  by mouth two (2) times a day.  
  
 elbasvir-grazoprevir  mg Tab Commonly known as:  Jayne Baba Take 1 Tab by mouth daily for 84 days. Indications: CHRONIC HEPATITIS C - GENOTYPE 1, renal  
  
 ergocalciferol 50,000 unit capsule Commonly known as:  ERGOCALCIFEROL Take 50,000 Units by mouth. FERROUS SULFATE PO Take  by mouth. furosemide 20 mg tablet Commonly known as:  LASIX Take  by mouth daily. gabapentin 300 mg capsule Commonly known as:  NEURONTIN Take 300 mg by mouth three (3) times daily. omega-3 fatty acids-vitamin e 1,000 mg Cap Take 1 Cap by mouth. Follow-up Instructions Return in about 17 weeks (around 1/16/2018). Introducing [facility]! Willadean Saint introduces [employer] patient portal. Now you can access parts of your medical record, email your doctor's office, and request medication refills online. 1. In your internet browser, go to [email or web address] 2. Click on the First Time User? Click Here link in the Sign In box. You will see the New Member Sign Up page. 3. Enter your [employer] Access Code exactly as it appears below. You will not need to use this code after youve completed the sign-up process. If you do not sign up before the expiration date, you must request a new code. · [employer] Access Code: [email or web address] Expires: 12/6/2017 10:13 AM 
 
4. Enter the last four digits of your Social Security Number (xxxx) and Date of Birth (mm/dd/yyyy) as indicated and click Submit. You will be taken to the next sign-up page. 5. Create a Clzby ID. This will be your Clzby login ID and cannot be changed, so think of one that is secure and easy to remember. 6. Create a Clzby password. You can change your password at any time. 7. Enter your Password Reset Question and Answer.  This can be used at a later time if you forget your password. 8. Enter your e-mail address. You will receive e-mail notification when new information is available in [address] 9. Click Sign Up. You can now view and download portions of your medical record. 10. Click the Download Summary menu link to download a portable copy of your medical information. If you have questions, please visit the Frequently Asked Questions section of the [employer]yer website. Remember, [employer]yer is NOT to be used for urgent needs. For medical emergencies, dial 911. Now available from your iPhone and Android! Please provide this summary of care documentation to your next provider. Your primary care clinician is listed as [employer]. If you have any questions after today's visit, please call [phone].

## 2017-09-19 NOTE — PROGRESS NOTES
[address] Cami Perdomo MD, [address], [facility] [place]       CHIP Heurta PA-C [place], [facility] Сергей, CHIP Santana NP        at [place] [id number] [facility], [address] [address].     [phone]     FAX: [phone]    at [facility], [address] [place] [address], [address]     [phone]     FAX: [phone]         Patient Care Team:  Kami Torres MD as PCP - General (Family Practice)  Gerardo Greene MD (Oncology)  Clemente Carr MD (Nephrology)      Problem List  Date Reviewed: 2017          Codes Class Noted    Secondary hypertension ICD-10-CM: I15.9  ICD-9-CM: 405.99  2016        S/p nephrectomy ICD-10-CM: Z90.5  ICD-9-CM: V45.73  2016    Overview Signed 2017 11:23 AM by Alix Vega NP     Right kidney in . Vitamin D deficiency ICD-10-CM: E55.9  ICD-9-CM: 268.9  2016        Hyperlipidemia ICD-10-CM: E78.5  ICD-9-CM: 272.4  2016        Hepatitis C, acute ICD-10-CM: B17.10  ICD-9-CM: 070.51  2016              Nighat Steiner returns to the [place] [address] today for education and management of chronic hepatitis C virus. She began HCV on 2017 with once daily Zepatier. She will be treated for 12 weeks total.  This is the only time the HCV has ever been treated. The active problem list, all pertinent past medical history, medications, liver histology, endoscopic studies, radiologic findings and laboratory findings related to the liver disorder were reviewed with the patient. The patient is a 80 y.o. Black female who was noted to have chronic HCV in the early . 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     Imaging of the liver was recently performed with ultrasound and this demonstrates a diffusely heterogeneous hepatic echotexture. No focal hepatic mass. A liver biopsy has not been performed. Shear wave elastography was performed and suggest mild disease, F1.  KPa is 6.31    The most recent laboratory studies indicate that the liver transaminases are normal, alkaline phosphatase is elevated, tests of hepatic synthetic and metabolic function are normal, and the platelet count is normal.      The patient has no symptoms which can be attributed to the liver disorder. She had a nephrectomy over 32 years ago for benign mass. The patient completes all daily activities without any functional limitations. The patient has not experienced fatigue, fevers, chills, shortness of breath, chest pain, pain in the right side over the liver, diffuse abdominal pain, nausea, vomiting, constipation, diarrhrea, dry eyes, dry mouth, arthralgias, myalgias, yellowing of the eyes or skin, itching, dark urine, problems concentrating, swelling of the abdomen, swelling of the lower extremities, hematemesis, or hematochezia. ALLERGIES  Allergies   Allergen Reactions    Iron Other (comments)     Intolerant -- IV infused iron    Percocet [Oxycodone-Acetaminophen] Other (comments)     Cardiac Dysrhythmia     Ultram [Tramadol] Other (comments)     Cardiac Dysrhythmia       MEDICATIONS  Current Outpatient Prescriptions   Medication Sig    elbasvir-grazoprevir (ZEPATIER)  mg tab Take 1 Tab by mouth daily for 84 days. Indications: CHRONIC HEPATITIS C - GENOTYPE 1, renal    ergocalciferol (ERGOCALCIFEROL) 50,000 unit capsule Take 50,000 Units by mouth.  cloNIDine HCl (CATAPRES) 0.1 mg tablet Take  by mouth two (2) times a day.  atorvastatin (LIPITOR) 10 mg tablet Take  by mouth daily.  furosemide (LASIX) 20 mg tablet Take  by mouth daily.  amLODIPine (NORVASC) 10 mg tablet Take  by mouth daily.  gabapentin (NEURONTIN) 300 mg capsule Take 300 mg by mouth three (3) times daily.     omega-3 fatty acids-vitamin e 1,000 mg cap Take 1 Cap by mouth.  FERROUS SULFATE PO Take  by mouth. No current facility-administered medications for this visit. SYSTEM REVIEW NOT RELATED TO LIVER DISEASE OR REVIEWED ABOVE:  Constitution systems: Negative for fever, chills, weight gain, weight loss. Eyes: Negative for visual changes. ENT: Negative for sore throat, painful swallowing. Respiratory: Negative for cough, hemoptysis, SOB. Cardiology: Negative for chest pain, palpitations. GI:  Negative for constipation or diarrhea. : Negative for urinary frequency, dysuria, hematuria, nocturia. Skin: Negative for rash. Hematology: Negative for easy bruising, blood clots. Musculo-skelatal: Negative for back pain, muscle pain, weakness. Neurologic: Negative for headaches, dizziness, vertigo, memory problems not related to HE. Psychology: Negative for anxiety, depression. FAMILY HISTORY:  There is no family history of liver disease. SOCIAL HISTORY:  The patient is . The patient has 1 child. The patient has never used tobacco products. The patient has never consumed significant amounts of alcohol. The patient does not work. PHYSICAL EXAMINATION:  Visit Vitals    /68 (BP 1 Location: Left arm, BP Patient Position: Sitting)    Pulse (!) 55    Temp 96.5 °F (35.8 °C) (Tympanic)    Resp 16    Ht 5' 7\" (1.702 m)    Wt 173 lb 9.6 oz (78.7 kg)    SpO2 98%    BMI 27.19 kg/m2     General: No acute distress. Eyes: Sclera anicteric. ENT: No oral lesions. Thyroid normal.  Nodes: No adenopathy. Skin: No spider angiomata. No jaundice. No palmar erythema. Respiratory: Lungs clear to auscultation. Cardiovascular: Regular heart rate. No murmurs. No JVD. Abdomen: Soft non-tender. Liver size normal to percussion/palpation. Spleen not palpable. No obvious ascites. Extremities: No edema. No muscle wasting. No gross arthritic changes. Neurologic: Alert and oriented.   Cranial nerves grossly intact. No asterixis. LABORATORY STUDIES:  Liver [place] of [address] [place] [address] & Units 9/7/2017 5/16/2017 2/13/2017   WBC 4.6 - 13.2 K/uL 4.8 3.7 (L) 4.1 (L)   ANC 1.8 - 8.0 K/UL 2.7 2.1 2.2   HGB 12.0 - 16.0 g/dL 13.6 13.1 12.4    - 420 K/uL 198 195 221   INR 0.8 - 1.2        AST 15 - 37 U/L 21 30 33   ALT 13 - 56 U/L 17 27 38   Alk Phos 45 - 117 U/L 224 (H) 217 (H) 188 (H)   Bili, Total 0.2 - 1.0 MG/DL 0.4 0.4 0.4   Bili, Direct 0.0 - 0.2 MG/DL 0.1 0.1 0.1   Albumin 3.4 - 5.0 g/dL 3.8 4.0 3.9   BUN 7.0 - 18 MG/DL 37 (H) 26 (H) 33 (H)   Creat 0.6 - 1.3 MG/DL 1.95 (H) 1.90 (H) 1.96 (H)   Na 136 - 145 mmol/L 137 141 143   K 3.5 - 5.5 mmol/L 4.2 3.7 4.7   Cl 100 - 108 mmol/L 103 105 103   CO2 21 - 32 mmol/L 26 28 28   Glucose 74 - 99 mg/dL 111 (H) 89 106 (H)     SEROLOGIES:  Serologies Latest Ref Rn 7/14/2016   Hep A Ab, Total NEGATIVE   Positive (A)   Hep B Surface Ag <1.00 Index <0.10   Hep B Surface Ag Interp NEG   NEGATIVE   Hep B Core Ab, Total NEGATIVE   NEGATIVE   Hep B Surface Ab >10.0 mIU/mL <3.10 (L)   Hep B Surface Ab Interp POS   NEGATIVE (A)   Hep C Genotype  1a   HCV RT-PCR, Quant IU/mL [id number]   Ferritin 8 - 388 NG/ML 91   Iron % Saturation % 29   ADDY, IFA  NEGATIVE   ASMCA 0 - 19 Units 14   M2 Ab 0.0 - 20.0 Units 8.0     LIVER HISTOLOGY:  07/2016. Shear wave elastography with ultrasound.  kPa was 6.31. Suggested fibrosis level is F1.    ENDOSCOPIC PROCEDURES:  Not available or performed    RADIOLOGY:  07/2016. Ultrasound of the liver. Diffusely heterogeneous hepatic echotexture. No focal hepatic mass. OTHER TESTING:  Not available or performed    ASSESSMENT AND PLAN:  Chronic HCV with portal fibrosis. Laboratory studies from 09/19/2017 indicate that the liver transaminases are normal, alkaline phosphatase is elevated, tests of hepatic synthetic and metabolic function are normal, and the platelet count is normal.  HCV RNA is still pending.      Serologic evaluation was negative for all causes of chronic liver disease. Based upon laboratory studies, imaging, and elastography, the patient does not appear to have advanced liver disease or cirrhosis. HCV. The patient has genotype 1A and is treatment naive. She is very healthy and likely has higher than expected life expectancy. Treatment is simple, finite, and safe. She began HCV on 08/07/2017 with once daily Zepatier. She will be treated for 12 weeks total.  She has no treatment related complaints. Ultrasound with shear wave elastography has been performed. This demonstrates an essentially normal liver with mild fibrosis, F1. The patient was directed to continue all current medications at the current dosages. There are no contraindications for the patient to take any medications that are necessary for treatment of other medical issues. Vaccination for viral hepatitis A not required. The patient has serologic evidence of prior exposure or vaccination with immunity. Vaccination for viral hepatitis B is recommended. The patient has no serologic evidence of prior exposure or vaccination with immunity. All of the above issues were discussed with the patient. All questions were answered. The patient expressed a clear understanding of the above. [facility] 87 in 17 weeks to assess for SVR 12.      Cedrick Mccain NP   Liver [place] of [address] [place] [address], [address] [place] [address] [address], [facility]   [phone]

## 2018-01-16 ENCOUNTER — OFFICE VISIT (OUTPATIENT)
Dept: HEMATOLOGY | Age: 83
End: 2018-01-16

## 2018-01-16 ENCOUNTER — HOSPITAL ENCOUNTER (OUTPATIENT)
Dept: LAB | Age: 83
Discharge: HOME | End: 2018-01-16
Payer: MEDICARE

## 2018-01-16 VITALS
SYSTOLIC BLOOD PRESSURE: 119 MMHG | DIASTOLIC BLOOD PRESSURE: 55 MMHG | TEMPERATURE: 97.8 F | HEART RATE: 50 BPM | WEIGHT: 170 LBS | RESPIRATION RATE: 16 BRPM | HEIGHT: 67 IN | BODY MASS INDEX: 26.68 KG/M2 | OXYGEN SATURATION: 95 %

## 2018-01-16 DIAGNOSIS — B18.2 CHRONIC HEPATITIS C WITHOUT HEPATIC COMA (HCC): Primary | ICD-10-CM

## 2018-01-16 DIAGNOSIS — B18.2 CHRONIC HEPATITIS C WITHOUT HEPATIC COMA (HCC): ICD-10-CM

## 2018-01-16 LAB
ALBUMIN SERPL-MCNC: 3.8 G/DL (ref 3.4–5)
ALBUMIN/GLOB SERPL: 0.9 {RATIO} (ref 0.8–1.7)
ALP SERPL-CCNC: 191 U/L (ref 45–117)
ALT SERPL-CCNC: 15 U/L (ref 13–56)
ANION GAP SERPL CALC-SCNC: 9 MMOL/L (ref 3–18)
AST SERPL-CCNC: 21 U/L (ref 15–37)
BASOPHILS # BLD: 0 K/UL (ref 0–0.06)
BASOPHILS NFR BLD: 1 % (ref 0–2)
BILIRUB DIRECT SERPL-MCNC: 0.1 MG/DL (ref 0–0.2)
BILIRUB SERPL-MCNC: 0.3 MG/DL (ref 0.2–1)
BUN SERPL-MCNC: 33 MG/DL (ref 7–18)
BUN/CREAT SERPL: 17 (ref 12–20)
CALCIUM SERPL-MCNC: 10.1 MG/DL (ref 8.5–10.1)
CHLORIDE SERPL-SCNC: 105 MMOL/L (ref 100–108)
CO2 SERPL-SCNC: 28 MMOL/L (ref 21–32)
CREAT SERPL-MCNC: 1.94 MG/DL (ref 0.6–1.3)
DIFFERENTIAL METHOD BLD: ABNORMAL
EOSINOPHIL # BLD: 0.2 K/UL (ref 0–0.4)
EOSINOPHIL NFR BLD: 5 % (ref 0–5)
ERYTHROCYTE [DISTWIDTH] IN BLOOD BY AUTOMATED COUNT: 13.5 % (ref 11.6–14.5)
GLOBULIN SER CALC-MCNC: 4.2 G/DL (ref 2–4)
GLUCOSE SERPL-MCNC: 112 MG/DL (ref 74–99)
HCT VFR BLD AUTO: 37.9 % (ref 35–45)
HGB BLD-MCNC: 12.4 G/DL (ref 12–16)
LYMPHOCYTES # BLD: 1.1 K/UL (ref 0.9–3.6)
LYMPHOCYTES NFR BLD: 26 % (ref 21–52)
MCH RBC QN AUTO: 29.1 PG (ref 24–34)
MCHC RBC AUTO-ENTMCNC: 32.7 G/DL (ref 31–37)
MCV RBC AUTO: 89 FL (ref 74–97)
MONOCYTES # BLD: 0.3 K/UL (ref 0.05–1.2)
MONOCYTES NFR BLD: 6 % (ref 3–10)
NEUTS SEG # BLD: 2.9 K/UL (ref 1.8–8)
NEUTS SEG NFR BLD: 62 % (ref 40–73)
PLATELET # BLD AUTO: 220 K/UL (ref 135–420)
PMV BLD AUTO: 12 FL (ref 9.2–11.8)
POTASSIUM SERPL-SCNC: 4.8 MMOL/L (ref 3.5–5.5)
PROT SERPL-MCNC: 8 G/DL (ref 6.4–8.2)
RBC # BLD AUTO: 4.26 M/UL (ref 4.2–5.3)
SODIUM SERPL-SCNC: 142 MMOL/L (ref 136–145)
WBC # BLD AUTO: 4.5 K/UL (ref 4.6–13.2)

## 2018-01-16 PROCEDURE — 36415 COLL VENOUS BLD VENIPUNCTURE: CPT | Performed by: NURSE PRACTITIONER

## 2018-01-16 PROCEDURE — 87521 HEPATITIS C PROBE&RVRS TRNSC: CPT | Performed by: NURSE PRACTITIONER

## 2018-01-16 PROCEDURE — 85025 COMPLETE CBC W/AUTO DIFF WBC: CPT | Performed by: NURSE PRACTITIONER

## 2018-01-16 PROCEDURE — 80048 BASIC METABOLIC PNL TOTAL CA: CPT | Performed by: NURSE PRACTITIONER

## 2018-01-16 PROCEDURE — 80076 HEPATIC FUNCTION PANEL: CPT | Performed by: NURSE PRACTITIONER

## 2018-01-16 NOTE — PROGRESS NOTES
1. Have you been to the ER, urgent care clinic since your last visit? Hospitalized since your last visit? No    2. Have you seen or consulted any other health care providers outside of the [address] [place] [address] since your last visit? Include any pap smears or colon screening.  No

## 2018-01-16 NOTE — PROGRESS NOTES
[address] Perdomo MD, [place], [facility], [place]       CHIP Moss PA-C Silva Andrew, [facility]   CHIP Wood NP        at [address], [address] [address]  22.     [phone]     FAX: [phone]    at [address], [address]     [phone]     FAX: [phone]         Patient Care Team:  Ludy Salmon MD as PCP - General (Family Practice)  Vianey Mendoza MD (Oncology)  Armando Matos MD (Nephrology)      Problem List  Date Reviewed: 1/16/2018          Codes Class Noted    Secondary hypertension ICD-10-CM: I15.9  ICD-9-CM: 405.99  7/14/2016        S/p nephrectomy ICD-10-CM: Z90.5  ICD-9-CM: V45.73  7/14/2016    Overview Signed 5/16/2017 11:23 AM by Chanel Smith NP     Right kidney in 1985. Vitamin D deficiency ICD-10-CM: E55.9  ICD-9-CM: 268.9  7/14/2016        Hyperlipidemia ICD-10-CM: E78.5  ICD-9-CM: 272.4  7/14/2016        Hepatitis C, acute ICD-10-CM: B17.10  ICD-9-CM: 070.51  7/14/2016              Bonita Perez returns to the [address] for education and management of chronic hepatitis C virus. The patient completed 12 weeks of hCV with once daily Zepatier. She began HCV on 08/07/2017 and finished the therapy around the first of November, 2017. This is the only time the HCV has ever been treated. The active problem list, all pertinent past medical history, medications, liver histology, endoscopic studies, radiologic findings and laboratory findings related to the liver disorder were reviewed with the patient. The patient is a 80 y.o. Black female who was noted to have chronic HCV in the early 1990's. 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     Imaging of the liver was recently performed with ultrasound and this demonstrates a diffusely heterogeneous hepatic echotexture. No focal hepatic mass. A liver biopsy has not been performed. Shear wave elastography was performed and suggest mild disease, F1.  KPa is 6.31    The most recent laboratory studies indicate that the liver transaminases are normal, alkaline phosphatase is elevated, tests of hepatic synthetic and metabolic function are normal, and the platelet count is normal.      The patient has no symptoms which can be attributed to the liver disorder. She had a nephrectomy over 33 years ago for benign mass. The patient completes all daily activities without any functional limitations. The patient has not experienced fatigue, fevers, chills, shortness of breath, chest pain, pain in the right side over the liver, diffuse abdominal pain, nausea, vomiting, constipation, diarrhrea, dry eyes, dry mouth, arthralgias, myalgias, yellowing of the eyes or skin, itching, dark urine, problems concentrating, swelling of the abdomen, swelling of the lower extremities, hematemesis, or hematochezia. ALLERGIES  Allergies   Allergen Reactions    Iron Other (comments)     Intolerant -- IV infused iron    Percocet [Oxycodone-Acetaminophen] Other (comments)     Cardiac Dysrhythmia     Ultram [Tramadol] Other (comments)     Cardiac Dysrhythmia       MEDICATIONS  Current Outpatient Prescriptions   Medication Sig    CALCIUM PO Take  by mouth.  ergocalciferol (ERGOCALCIFEROL) 50,000 unit capsule Take 50,000 Units by mouth.  cloNIDine HCl (CATAPRES) 0.1 mg tablet Take  by mouth two (2) times a day.  atorvastatin (LIPITOR) 10 mg tablet Take  by mouth daily.  furosemide (LASIX) 20 mg tablet Take  by mouth daily.  amLODIPine (NORVASC) 10 mg tablet Take  by mouth daily.  gabapentin (NEURONTIN) 300 mg capsule Take 300 mg by mouth three (3) times daily.  omega-3 fatty acids-vitamin e 1,000 mg cap Take 1 Cap by mouth.     FERROUS SULFATE PO Take by mouth. No current facility-administered medications for this visit. SYSTEM REVIEW NOT RELATED TO LIVER DISEASE OR REVIEWED ABOVE:  Constitution systems: Negative for fever, chills, weight gain, weight loss. Eyes: Negative for visual changes. ENT: Negative for sore throat, painful swallowing. Respiratory: Negative for cough, hemoptysis, SOB. Cardiology: Negative for chest pain, palpitations. GI:  Negative for constipation or diarrhea. : Negative for urinary frequency, dysuria, hematuria, nocturia. Skin: Negative for rash. Hematology: Negative for easy bruising, blood clots. Musculo-skelatal: Negative for back pain, muscle pain, weakness. Neurologic: Negative for headaches, dizziness, vertigo, memory problems not related to HE. Psychology: Negative for anxiety, depression. FAMILY HISTORY:  There is no family history of liver disease. SOCIAL HISTORY:  The patient is . The patient has 1 child. The patient has never used tobacco products. The patient has never consumed significant amounts of alcohol. The patient does not work. PHYSICAL EXAMINATION:  Visit Vitals    /55 (BP 1 Location: Right arm, BP Patient Position: Sitting)    Pulse (!) 50    Temp 97.8 °F (36.6 °C) (Tympanic)    Resp 16    Ht 5' 7\" (1.702 m)    Wt 170 lb (77.1 kg)    SpO2 95%    BMI 26.63 kg/m2     General: No acute distress. Eyes: Sclera anicteric. ENT: No oral lesions. Thyroid normal.  Nodes: No adenopathy. Skin: No spider angiomata. No jaundice. No palmar erythema. Respiratory: Lungs clear to auscultation. Cardiovascular: Regular heart rate. No murmurs. No JVD. Abdomen: Soft non-tender. Liver size normal to percussion/palpation. Spleen not palpable. No obvious ascites. Extremities: No edema. No muscle wasting. No gross arthritic changes. Neurologic: Alert and oriented. Cranial nerves grossly intact. No asterixis.     LABORATORY STUDIES:  Liver [facility] Ref Rng & Units 9/7/2017 5/16/2017 2/13/2017   WBC 4.6 - 13.2 K/uL 4.8 3.7 (L) 4.1 (L)   ANC 1.8 - 8.0 K/UL 2.7 2.1 2.2   HGB 12.0 - 16.0 g/dL 13.6 13.1 12.4    - 420 K/uL 198 195 221   INR 0.8 - 1.2        AST 15 - 37 U/L 21 30 33   ALT 13 - 56 U/L 17 27 38   Alk Phos 45 - 117 U/L 224 (H) 217 (H) 188 (H)   Bili, Total 0.2 - 1.0 MG/DL 0.4 0.4 0.4   Bili, Direct 0.0 - 0.2 MG/DL 0.1 0.1 0.1   Albumin 3.4 - 5.0 g/dL 3.8 4.0 3.9   BUN 7.0 - 18 MG/DL 37 (H) 26 (H) 33 (H)   Creat 0.6 - 1.3 MG/DL 1.95 (H) 1.90 (H) 1.96 (H)   Na 136 - 145 mmol/L 137 141 143   K 3.5 - 5.5 mmol/L 4.2 3.7 4.7   Cl 100 - 108 mmol/L 103 105 103   CO2 21 - 32 mmol/L 26 28 28   Glucose 74 - 99 mg/dL 111 (H) 89 106 (H)     SEROLOGIES:  Serologies Latest Ref Rng 7/14/2016   Hep A Ab, Total NEGATIVE   Positive (A)   Hep B Surface Ag <1.00 Index <0.10   Hep B Surface Ag Interp NEG   NEGATIVE   Hep B Core Ab, Total NEGATIVE   NEGATIVE   Hep B Surface Ab >10.0 mIU/mL <3.10 (L)   Hep B Surface Ab Interp POS   NEGATIVE (A)   Hep C Genotype  1a   HCV RT-PCR, Quant IU/mL 032573   Ferritin 8 - 388 NG/ML 91   Iron % Saturation % 29   ADDY, IFA  NEGATIVE   ASMCA 0 - 19 Units 14   M2 Ab 0.0 - 20.0 Units 8.0     LIVER HISTOLOGY:  07/2016. Shear wave elastography with ultrasound.  kPa was 6.31. Suggested fibrosis level is F1.    ENDOSCOPIC PROCEDURES:  Not available or performed    RADIOLOGY:  07/2016. Ultrasound of the liver. Diffusely heterogeneous hepatic echotexture. No focal hepatic mass. OTHER TESTING:  Not available or performed    ASSESSMENT AND PLAN:  Chronic HCV with portal fibrosis. Laboratory studies from 09/19/2017 indicate that the liver transaminases are normal, alkaline phosphatase is elevated, tests of hepatic synthetic and metabolic function are normal, and the platelet count is normal.  Will perform laboratory testing to monitor liver function and degree of liver injury.  This will include hepatic panel, a CBC w/ diff, a BMP, and HCV RNA. Serologic evaluation was negative for all causes of chronic liver disease. Based upon laboratory studies, imaging, and elastography, the patient does not appear to have advanced liver disease or cirrhosis. HCV. The patient has genotype 1A. The patient completed 12 weeks of hCV with once daily Zepatier. She began HCV on 08/07/2017 and finished the therapy around the first of November, 2017. This is the only time the HCV has ever been treated. She reports that she did not miss any doses of the medication. She reports that she did not have any treatment related complications. She had no detectable HCV RNA at treatment week four. Ultrasound with shear wave elastography has been performed. This demonstrates an essentially normal liver with mild fibrosis, F1. The patient was directed to continue all current medications at the current dosages. There are no contraindications for the patient to take any medications that are necessary for treatment of other medical issues. Vaccination for viral hepatitis A not required. The patient has serologic evidence of prior exposure or vaccination with immunity. Vaccination for viral hepatitis B is recommended. The patient has no serologic evidence of prior exposure or vaccination with immunity. All of the above issues were discussed with the patient. All questions were answered. The patient expressed a clear understanding of the above. [address] [address] in 41 weeks to assess for continued SVR one year after completing hCV therapy.       Abelardo Yeboah NP   [facility] of [address] [place] [address] [place]ST, [address] [place] [address] Latia Larson, [address] [place] [address]   [phone]

## 2018-01-16 NOTE — MR AVS SNAPSHOT
[place] [address] [place] [address] 
[phone] Patient: Lazara Bello MRN: [id number] Z:[id number]/61/[id number] Visit Information Date & Time Provider Department Dept. Phone Encounter #  
 1/16/2018 10:00 AM CHIP Santo of [facility] [phone] Follow-up Instructions Return in about 42 weeks (around 11/6/2018). Upcoming Health Maintenance Date Due DTaP/Tdap/Td series (1 - Tdap) 9/28/1955 ZOSTER VACCINE AGE 60> 7/28/1994 GLAUCOMA SCREENING Q2Y 9/28/1999 OSTEOPOROSIS SCREENING (DEXA) 9/28/1999 Pneumococcal 65+ Low/Medium Risk (1 of 2 - PCV13) 9/28/1999 MEDICARE YEARLY EXAM 9/28/1999 Influenza Age 5 to Adult 8/1/2017 Allergies as of 1/16/2018  Review Complete On: 1/16/2018 By: Ashlyn Organ Severity Noted Reaction Type Reactions Iron  07/14/2016    Other (comments) Intolerant -- IV infused iron Percocet [Oxycodone-acetaminophen]  07/14/2016    Other (comments) Cardiac Dysrhythmia Ultram [Tramadol]  07/14/2016    Other (comments) Cardiac Dysrhythmia Current Immunizations  Never Reviewed No immunizations on file. Not reviewed this visit You Were Diagnosed With   
  
 Codes Comments Chronic hepatitis C without hepatic coma (HCC)    -  Primary ICD-10-CM: B18.2 ICD-9-CM: 070.54 Vitals BP Pulse Temp Resp Height(growth percentile) 119/55 (BP 1 Location: Right arm, BP Patient Position: Sitting) (!) 50 97.8 °F (36.6 °C) (Tympanic) 16 5' 7\" (1.702 m) Weight(growth percentile) SpO2 BMI OB Status Smoking Status 170 lb (77.1 kg) 95% 26.63 kg/m2 Hysterectomy Former Smoker BMI and BSA Data Body Mass Index Body Surface Area  
 26.63 kg/m 2 1.91 m 2 Preferred Pharmacy Pharmacy Name Phone Shun Romo @ [address] [facility], [address] [place] [address] [phone] Your Updated Medication List  
  
   
This list is accurate as of: 1/16/18 10:26 AM.  Always use your most recent med list. amLODIPine 10 mg tablet Commonly known as:  [place]on [place]a Take  by mouth daily. atorvastatin 10 mg tablet Commonly known as:  LIPITOR Take  by mouth daily. CALCIUM PO Take  by mouth.  
  
 cloNIDine HCl 0.1 mg tablet Commonly known as:  CATAPRES Take  by mouth two (2) times a day. ergocalciferol 50,000 unit capsule Commonly known as:  ERGOCALCIFEROL Take 50,000 Units by mouth. FERROUS SULFATE PO Take  by mouth. furosemide 20 mg tablet Commonly known as:  LASIX Take  by mouth daily. gabapentin 300 mg capsule Commonly known as:  NEURONTIN Take 300 mg by mouth three (3) times daily. omega-3 fatty acids-vitamin e 1,000 mg Cap Take 1 Cap by mouth. Follow-up Instructions Return in about 42 weeks (around 11/6/2018). Introducing [facility]! Ramesh Her introduces [email or web address] patient portal. Now you can access parts of your medical record, email your doctor's office, and request medication refills online. 1. In your internet browser, go to [email or web address] 2. Click on the First Time User? Click Here link in the Sign In box. You will see the New Member Sign Up page. 3. Enter your [email or web address] Access Code exactly as it appears below. You will not need to use this code after youve completed the sign-up process. If you do not sign up before the expiration date, you must request a new code. · [email or web address] Access Code: [email or web address] Expires: 4/16/2018 10:26 AM 
 
4. Enter the last four digits of your Social Security Number (xxxx) and Date of Birth (mm/dd/yyyy) as indicated and click Submit. You will be taken to the next sign-up page. 5. Create a [email or web address] ID. This will be your [email or web address] login ID and cannot be changed, so think of one that is secure and easy to remember. 6. Create a [employer] password. You can change your password at any time. 7. Enter your Password Reset Question and Answer. This can be used at a later time if you forget your password. 8. Enter your e-mail address. You will receive e-mail notification when new information is available in [address] 9. Click Sign Up. You can now view and download portions of your medical record. 10. Click the Download Summary menu link to download a portable copy of your medical information. If you have questions, please visit the Frequently Asked Questions section of the [employer] website. Remember, [employer] is NOT to be used for urgent needs. For medical emergencies, dial 911. Now available from your iPhone and Android! Please provide this summary of care documentation to your next provider. Your primary care clinician is listed as [employer]. If you have any questions after today's visit, please call [phone].

## 2018-01-19 LAB
HCV RNA SERPL NAA+PROBE-LOG IU: NOT DETECTED HCV LOG 10IU/ML
HCV RNA SERPL PROBE AMP-ACNC: NOT DETECTED HCVIU/ML
HCV RNA SERPL QL NAA+PROBE: NOT DETECTED

## 2018-01-24 ENCOUNTER — TELEPHONE (OUTPATIENT)
Dept: HEMATOLOGY | Age: 83
End: 2018-01-24

## 2018-01-24 NOTE — TELEPHONE ENCOUNTER
Patient called back about lab results. After reviewing her chart I relayed NP Neville Mock's findings from the most recent lab work. Patient confirmed understanding.

## 2018-01-24 NOTE — PROGRESS NOTES
Please let her know that her labs look good. The HCV is gone, treated and cured. Other labs look good except her kidney numbers, which are unchanged. Thank you.

## 2018-11-06 ENCOUNTER — OFFICE VISIT (OUTPATIENT)
Dept: HEMATOLOGY | Age: 83
End: 2018-11-06

## 2018-11-06 ENCOUNTER — HOSPITAL ENCOUNTER (OUTPATIENT)
Dept: LAB | Age: 83
Discharge: HOME | End: 2018-11-06
Payer: MEDICARE

## 2018-11-06 VITALS
DIASTOLIC BLOOD PRESSURE: 69 MMHG | HEART RATE: 58 BPM | OXYGEN SATURATION: 97 % | SYSTOLIC BLOOD PRESSURE: 152 MMHG | TEMPERATURE: 97.3 F | HEIGHT: 67 IN | RESPIRATION RATE: 18 BRPM | BODY MASS INDEX: 27.44 KG/M2 | WEIGHT: 174.8 LBS

## 2018-11-06 DIAGNOSIS — B18.2 CHRONIC HEPATITIS C WITHOUT HEPATIC COMA (HCC): Primary | ICD-10-CM

## 2018-11-06 DIAGNOSIS — B18.2 CHRONIC HEPATITIS C WITHOUT HEPATIC COMA (HCC): ICD-10-CM

## 2018-11-06 LAB
ALBUMIN SERPL-MCNC: 4.1 G/DL (ref 3.4–5)
ALBUMIN/GLOB SERPL: 0.9 {RATIO} (ref 0.8–1.7)
ALP SERPL-CCNC: 90 U/L (ref 45–117)
ALT SERPL-CCNC: 15 U/L (ref 13–56)
ANION GAP SERPL CALC-SCNC: 10 MMOL/L (ref 3–18)
AST SERPL-CCNC: 19 U/L (ref 15–37)
BASOPHILS # BLD: 0.1 K/UL (ref 0–0.1)
BASOPHILS NFR BLD: 1 % (ref 0–2)
BILIRUB DIRECT SERPL-MCNC: 0.1 MG/DL (ref 0–0.2)
BILIRUB SERPL-MCNC: 0.3 MG/DL (ref 0.2–1)
BUN SERPL-MCNC: 28 MG/DL (ref 7–18)
BUN/CREAT SERPL: 14
CALCIUM SERPL-MCNC: 9.8 MG/DL (ref 8.5–10.1)
CHLORIDE SERPL-SCNC: 103 MMOL/L (ref 100–108)
CO2 SERPL-SCNC: 26 MMOL/L (ref 21–32)
CREAT SERPL-MCNC: 2.02 MG/DL (ref 0.6–1.3)
DIFFERENTIAL METHOD BLD: ABNORMAL
EOSINOPHIL # BLD: 0.2 K/UL (ref 0–0.4)
EOSINOPHIL NFR BLD: 5 % (ref 0–5)
ERYTHROCYTE [DISTWIDTH] IN BLOOD BY AUTOMATED COUNT: 13.5 % (ref 11.6–14.5)
GLOBULIN SER CALC-MCNC: 4.4 G/DL (ref 2–4)
GLUCOSE SERPL-MCNC: 94 MG/DL (ref 74–99)
HCT VFR BLD AUTO: 38.9 % (ref 35–45)
HGB BLD-MCNC: 12.8 G/DL (ref 12–16)
LYMPHOCYTES # BLD: 1.3 K/UL (ref 0.9–3.6)
LYMPHOCYTES NFR BLD: 29 % (ref 21–52)
MCH RBC QN AUTO: 28.7 PG (ref 24–34)
MCHC RBC AUTO-ENTMCNC: 32.9 G/DL (ref 31–37)
MCV RBC AUTO: 87.2 FL (ref 74–97)
MONOCYTES # BLD: 0.3 K/UL (ref 0.05–1.2)
MONOCYTES NFR BLD: 7 % (ref 3–10)
NEUTS SEG # BLD: 2.5 K/UL (ref 1.8–8)
NEUTS SEG NFR BLD: 58 % (ref 40–73)
PLATELET # BLD AUTO: 233 K/UL (ref 135–420)
PMV BLD AUTO: 11.4 FL (ref 9.2–11.8)
POTASSIUM SERPL-SCNC: 4.4 MMOL/L (ref 3.5–5.5)
PROT SERPL-MCNC: 8.5 G/DL (ref 6.4–8.2)
RBC # BLD AUTO: 4.46 M/UL (ref 4.2–5.3)
SODIUM SERPL-SCNC: 139 MMOL/L (ref 136–145)
WBC # BLD AUTO: 4.4 K/UL (ref 4.6–13.2)

## 2018-11-06 PROCEDURE — 80076 HEPATIC FUNCTION PANEL: CPT | Performed by: NURSE PRACTITIONER

## 2018-11-06 PROCEDURE — 85025 COMPLETE CBC W/AUTO DIFF WBC: CPT | Performed by: NURSE PRACTITIONER

## 2018-11-06 PROCEDURE — 80048 BASIC METABOLIC PNL TOTAL CA: CPT | Performed by: NURSE PRACTITIONER

## 2018-11-06 PROCEDURE — 36415 COLL VENOUS BLD VENIPUNCTURE: CPT | Performed by: NURSE PRACTITIONER

## 2018-11-06 PROCEDURE — 87522 HEPATITIS C REVRS TRNSCRPJ: CPT | Performed by: NURSE PRACTITIONER

## 2018-11-06 NOTE — PROGRESS NOTES
[address] MD [place], [address], [facility], [place]       CHIP Sow PA-C Lonne Dross, JESSICAP-BC   CHIP Ware NP        at [address], [address] [address]     [phone]     FAX: [phone]    at [address], [address]     [phone]     FAX: [phone]         Patient Care Team:  Jessy Novak MD as PCP - General (Family Practice)  Monica Munoz MD (Oncology)  Haim Jarquin MD (Nephrology)      Problem List  Date Reviewed: 1/16/2018          Codes Class Noted    Secondary hypertension ICD-10-CM: I15.9  ICD-9-CM: 405.99  7/14/2016        S/p nephrectomy ICD-10-CM: Z90.5  ICD-9-CM: V45.73  7/14/2016    Overview Signed 5/16/2017 11:23 AM by Darrel Burton NP     Right kidney in 1985. Vitamin D deficiency ICD-10-CM: E55.9  ICD-9-CM: 268.9  7/14/2016        Hyperlipidemia ICD-10-CM: E78.5  ICD-9-CM: 272.4  7/14/2016        Hepatitis C, acute ICD-10-CM: B17.10  ICD-9-CM: 070.51  7/14/2016              Jimbo Garcia returns to the [address] for education and management of chronic hepatitis C virus. The patient completed 12 weeks of hCV with once daily Zepatier. She began HCV on 08/07/2017 and finished the therapy around the first of November, 2017. She was SVR 12. HCV successfully treated and cured. This is the only time the HCV has ever been treated. The active problem list, all pertinent past medical history, medications, liver histology, endoscopic studies, radiologic findings and laboratory findings related to the liver disorder were reviewed with the patient. The patient is a 80 y.o. Black female who was noted to have chronic HCV in the early 1990's.     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 Imaging of the liver was recently performed with ultrasound and this demonstrates a diffusely heterogeneous hepatic echotexture. No focal hepatic mass. A liver biopsy has not been performed. Shear wave elastography was performed and suggest mild disease, F1.  KPa is 6.31    The most recent laboratory studies indicate that the liver transaminases are normal, alkaline phosphatase is elevated, tests of hepatic synthetic and metabolic function are normal, and the platelet count is normal.      The patient has no symptoms which can be attributed to the liver disorder. She had a nephrectomy over 34 years ago for benign mass. The patient completes all daily activities without any functional limitations. The patient has not experienced fatigue, fevers, chills, shortness of breath, chest pain, pain in the right side over the liver, diffuse abdominal pain, nausea, vomiting, constipation, diarrhrea, dry eyes, dry mouth, arthralgias, myalgias, yellowing of the eyes or skin, itching, dark urine, problems concentrating, swelling of the abdomen, swelling of the lower extremities, hematemesis, or hematochezia. ALLERGIES  Allergies   Allergen Reactions    Iron Other (comments)     Intolerant -- IV infused iron    Percocet [Oxycodone-Acetaminophen] Other (comments)     Cardiac Dysrhythmia     Ultram [Tramadol] Other (comments)     Cardiac Dysrhythmia       MEDICATIONS  Current Outpatient Medications   Medication Sig    CALCIUM PO Take  by mouth.  ergocalciferol (ERGOCALCIFEROL) 50,000 unit capsule Take 50,000 Units by mouth.  cloNIDine HCl (CATAPRES) 0.1 mg tablet Take  by mouth two (2) times a day.  atorvastatin (LIPITOR) 10 mg tablet Take  by mouth daily.  furosemide (LASIX) 20 mg tablet Take  by mouth daily.  amLODIPine (NORVASC) 10 mg tablet Take  by mouth daily.  gabapentin (NEURONTIN) 300 mg capsule Take 300 mg by mouth three (3) times daily.     omega-3 fatty acids-vitamin e 1,000 mg cap Take 1 Cap by mouth.  FERROUS SULFATE PO Take  by mouth. No current facility-administered medications for this visit. SYSTEM REVIEW NOT RELATED TO LIVER DISEASE OR REVIEWED ABOVE:  Constitution systems: Negative for fever, chills, weight gain, weight loss. Eyes: Negative for visual changes. ENT: Negative for sore throat, painful swallowing. Respiratory: Negative for cough, hemoptysis, SOB. Cardiology: Negative for chest pain, palpitations. GI:  Negative for constipation or diarrhea. : Negative for urinary frequency, dysuria, hematuria, nocturia. Skin: Negative for rash. Hematology: Negative for easy bruising, blood clots. Musculo-skelatal: Negative for back pain, muscle pain, weakness. Neurologic: Negative for headaches, dizziness, vertigo, memory problems not related to HE. Psychology: Negative for anxiety, depression. FAMILY HISTORY:  There is no family history of liver disease. SOCIAL HISTORY:  The patient is . The patient has 1 child. The patient has never used tobacco products. The patient has never consumed significant amounts of alcohol. The patient does not work. PHYSICAL EXAMINATION:  Visit Vitals  /69 (BP 1 Location: Left arm, BP Patient Position: Sitting)   Pulse (!) 58   Temp 97.3 °F (36.3 °C) (Tympanic)   Resp 18   Ht 5' 7\" (1.702 m)   Wt 174 lb 12.8 oz (79.3 kg)   SpO2 97%   BMI 27.38 kg/m²     General: No acute distress. Eyes: Sclera anicteric. ENT: No oral lesions. Thyroid normal.  Nodes: No adenopathy. Skin: No spider angiomata. No jaundice. No palmar erythema. Respiratory: Lungs clear to auscultation. Cardiovascular: Regular heart rate. No murmurs. No JVD. Abdomen: Soft non-tender. Liver size normal to percussion/palpation. Spleen not palpable. No obvious ascites. Extremities: No edema. No muscle wasting. No gross arthritic changes. Neurologic: Alert and oriented.   Cranial nerves grossly intact. No asterixis. LABORATORY STUDIES:  Liver [place] of [address] & Units 1/16/2018 9/7/2017 5/16/2017   WBC 4.6 - 13.2 K/uL 4.5 (L) 4.8 3.7 (L)   ANC 1.8 - 8.0 K/UL 2.9 2.7 2.1   HGB 12.0 - 16.0 g/dL 12.4 13.6 13.1    - 420 K/uL 220 198 195   INR 0.8 - 1.2        AST 15 - 37 U/L 21 21 30   ALT 13 - 56 U/L 15 17 27   Alk Phos 45 - 117 U/L 191 (H) 224 (H) 217 (H)   Bili, Total 0.2 - 1.0 MG/DL 0.3 0.4 0.4   Bili, Direct 0.0 - 0.2 MG/DL 0.1 0.1 0.1   Albumin 3.4 - 5.0 g/dL 3.8 3.8 4.0   BUN 7.0 - 18 MG/DL 33 (H) 37 (H) 26 (H)   Creat 0.6 - 1.3 MG/DL 1.94 (H) 1.95 (H) 1.90 (H)   Na 136 - 145 mmol/L 142 137 141   K 3.5 - 5.5 mmol/L 4.8 4.2 3.7   Cl 100 - 108 mmol/L 105 103 105   CO2 21 - 32 mmol/L 28 26 28   Glucose 74 - 99 mg/dL 112 (H) 111 (H) 89     SEROLOGIES:  Serologies Latest Ref Rn 7/14/2016   Hep A Ab, Total NEGATIVE   Positive (A)   Hep B Surface Ag <1.00 Index <0.10   Hep B Surface Ag Interp NEG   NEGATIVE   Hep B Core Ab, Total NEGATIVE   NEGATIVE   Hep B Surface Ab >10.0 mIU/mL <3.10 (L)   Hep B Surface Ab Interp POS   NEGATIVE (A)   Hep C Genotype  1a   HCV RT-PCR, Quant IU/mL [id number]   Ferritin 8 - 388 NG/ML 91   Iron % Saturation % 29   ADDY, IFA  NEGATIVE   ASMCA 0 - 19 Units 14   M2 Ab 0.0 - 20.0 Units 8.0     LIVER HISTOLOGY:  07/2016. Shear wave elastography with ultrasound.  kPa was 6.31. Suggested fibrosis level is F1.    ENDOSCOPIC PROCEDURES:  Not available or performed    RADIOLOGY:  07/2016. Ultrasound of the liver. Diffusely heterogeneous hepatic echotexture. No focal hepatic mass. OTHER TESTING:  Not available or performed    ASSESSMENT AND PLAN:  Chronic HCV with portal fibrosis.   Laboratory studies from 09/19/2017 indicate that the liver transaminases are normal, alkaline phosphatase is elevated, tests of hepatic synthetic and metabolic function are normal, and the platelet count is normal.  Will perform laboratory testing to monitor liver function and degree of liver injury. This will include hepatic panel, a CBC w/ diff, a BMP, and HCV RNA. Serologic evaluation was negative for all causes of chronic liver disease. Based upon laboratory studies, imaging, and elastography, the patient does not appear to have advanced liver disease or cirrhosis. HCV. The patient has genotype 1A. The patient completed 12 weeks of HCV with once daily Zepatier. She began HCV on 08/07/2017 and finished the therapy around the first of November, 2017. This is the only time the HCV has ever been treated. She reports that she did not miss any doses of the medication. She reports that she did not have any treatment related complications. She had no detectable HCV RNA at treatment week four. She was SVR 12. HCV has been eradicated. Ultrasound with shear wave elastography has been performed. This demonstrates an essentially normal liver with mild fibrosis, F1. Repeat ultrasound with shear wave elastography was ordered today. The patient was directed to continue all current medications at the current dosages. There are no contraindications for the patient to take any medications that are necessary for treatment of other medical issues. Vaccination for viral hepatitis A not required. The patient has serologic evidence of prior exposure or vaccination with immunity. Vaccination for viral hepatitis B is recommended. The patient has no serologic evidence of prior exposure or vaccination with immunity. All of the above issues were discussed with the patient. All questions were answered. The patient expressed a clear understanding of the above. Follow-up Kamran Mckeon 32 on a PRN basis.       Tank Valentine NP   [facility] of [address] [place] [address], [address] [place] [address] Latia Larson, [address] [facility]   [phone]

## 2018-11-11 LAB
HCV RNA SERPL NAA+PROBE-LOG IU: NOT DETECTED HCV LOG 10IU/ML
HCV RNA SERPL PROBE AMP-ACNC: NOT DETECTED HCVIU/ML

## 2018-11-16 ENCOUNTER — HOSPITAL ENCOUNTER (OUTPATIENT)
Dept: ULTRASOUND IMAGING | Age: 83
Discharge: HOME | End: 2018-11-16
Payer: MEDICARE

## 2018-11-16 DIAGNOSIS — B18.2 CHRONIC HEPATITIS C WITHOUT HEPATIC COMA (HCC): ICD-10-CM

## 2018-11-16 PROCEDURE — 0346T US ABD COMP W ELASTOGRAPHY: CPT
